# Patient Record
Sex: FEMALE | Race: BLACK OR AFRICAN AMERICAN | NOT HISPANIC OR LATINO | URBAN - METROPOLITAN AREA
[De-identification: names, ages, dates, MRNs, and addresses within clinical notes are randomized per-mention and may not be internally consistent; named-entity substitution may affect disease eponyms.]

---

## 2021-09-24 ENCOUNTER — APPOINTMENT (RX ONLY)
Dept: URBAN - METROPOLITAN AREA CLINIC 41 | Facility: CLINIC | Age: 31
Setting detail: DERMATOLOGY
End: 2021-09-24

## 2021-09-24 DIAGNOSIS — L85.3 XEROSIS CUTIS: ICD-10-CM

## 2021-09-24 DIAGNOSIS — L29.89 OTHER PRURITUS: ICD-10-CM

## 2021-09-24 DIAGNOSIS — L20.89 OTHER ATOPIC DERMATITIS: ICD-10-CM | Status: INADEQUATELY CONTROLLED

## 2021-09-24 DIAGNOSIS — L29.8 OTHER PRURITUS: ICD-10-CM

## 2021-09-24 PROCEDURE — 99204 OFFICE O/P NEW MOD 45 MIN: CPT

## 2021-09-24 PROCEDURE — ? TREATMENT REGIMEN

## 2021-09-24 PROCEDURE — ? PRESCRIPTION

## 2021-09-24 PROCEDURE — ? PRESCRIPTION MEDICATION MANAGEMENT

## 2021-09-24 PROCEDURE — ? ADDITIONAL NOTES

## 2021-09-24 PROCEDURE — ? COUNSELING

## 2021-09-24 RX ORDER — PIMECROLIMUS 10 MG/G
CREAM TOPICAL
Qty: 100 | Refills: 2 | Status: ERX | COMMUNITY
Start: 2021-09-24

## 2021-09-24 RX ORDER — MOMETASONE FUROATE 1 MG/G
OINTMENT TOPICAL
Qty: 45 | Refills: 0 | Status: ERX | COMMUNITY
Start: 2021-09-24

## 2021-09-24 RX ORDER — TRIAMCINOLONE ACETONIDE 1 MG/G
OINTMENT TOPICAL
Qty: 80 | Refills: 1 | Status: ERX | COMMUNITY
Start: 2021-09-24

## 2021-09-24 RX ADMIN — PIMECROLIMUS: 10 CREAM TOPICAL at 00:00

## 2021-09-24 RX ADMIN — MOMETASONE FUROATE: 1 OINTMENT TOPICAL at 00:00

## 2021-09-24 RX ADMIN — TRIAMCINOLONE ACETONIDE: 1 OINTMENT TOPICAL at 00:00

## 2021-09-24 ASSESSMENT — LOCATION DETAILED DESCRIPTION DERM: LOCATION DETAILED: RIGHT INFERIOR ANTERIOR NECK

## 2021-09-24 ASSESSMENT — LOCATION ZONE DERM: LOCATION ZONE: NECK

## 2021-09-24 ASSESSMENT — LOCATION SIMPLE DESCRIPTION DERM: LOCATION SIMPLE: RIGHT ANTERIOR NECK

## 2021-09-24 NOTE — PROCEDURE: PRESCRIPTION MEDICATION MANAGEMENT
Continue Regimen: Triamcinolone ointment 0.1% for less severe flares
Initiate Treatment: Mometasone ointment for really bad flares\\nElidel for face
Detail Level: Zone
Render In Strict Bullet Format?: No

## 2021-09-24 NOTE — PROCEDURE: COUNSELING
Antihistamine Recommendations: Claritin QAM
Cleanser Recommendations: Mild, liquid cleansers i.e Cerave or Cetaphil
Patient Specific Counseling (Will Not Stick From Patient To Patient): Pt notes that today it is present on her neck but usually also present on her face and eyelids. Pt has photos of when condition is flared on face and shows NG. Pt also has pictures of this condition on her hands. Pt notes itching on arms and legs but not comparable to the severity on her face. Pt notes she takes Allegra every morning for her seasonal allergy. Pt notes she has pollen allergy but takes Allegra year round. Or notes her last allergist evaluation was this time last year. Or also notes she recently developed allergy to tree nuts and peanuts. Pt notes I think he past two years she has been getting more flares. \\n\\nNG counsels pt that she has eczema as well as allergies and these allergies are triggering her eczema. \\n\\nNG advises pt to go back to the allergist as a second opinion and to check in again. \\nPt was given triamcinolone and hydrocortisone and notes that the triamcinolone helps when applying it everyday and then flared again\\n\\nNG counsels pt to blow dry hair nightly to get allergens out of pt’s hair.\\n\\nPt asks about skin care routine. NG counsels pt to use vanicream cleanse and moisturizing cream and to avoid other facial products due to causing eczema flare.\\n\\nPt asks about dietary restrictions and Ng counsels diet does not effect eczema and recommends distressing and getting allergies under control.
Detail Level: Detailed
Moisturizer Recommendations: Gentle, fragrance free moisturizers i.e Cerave or Cetaphil
Moisturizer Recommendations: Amlactin
Detail Level: Zone

## 2021-09-24 NOTE — PROCEDURE: TREATMENT REGIMEN
Detail Level: Zone
Otc Regimen: Xyzal\\nAllegra\\nEucerin eczema\\nVanicream cleanser and cream
Plan: NG advises pt to add Xyzal to her nighttime treatment to be taken at dinner time. Continue Allegra in the morning.

## 2021-09-24 NOTE — HPI: RASH (ECZEMA)
Is This A New Presentation, Or A Follow-Up?: Rash
Additional History: New pt, always had eczema but recently has been flaring.\\nPt has used triamcinolone 0.1% ointment and hydrocortisone, which helped only a little bit.